# Patient Record
Sex: FEMALE | Race: WHITE
[De-identification: names, ages, dates, MRNs, and addresses within clinical notes are randomized per-mention and may not be internally consistent; named-entity substitution may affect disease eponyms.]

---

## 2017-10-19 ENCOUNTER — HOSPITAL ENCOUNTER (OUTPATIENT)
Dept: HOSPITAL 5 - MAMMO | Age: 37
Discharge: HOME | End: 2017-10-19
Attending: FAMILY MEDICINE
Payer: COMMERCIAL

## 2017-10-19 DIAGNOSIS — M25.531: ICD-10-CM

## 2017-10-19 DIAGNOSIS — M25.562: ICD-10-CM

## 2017-10-19 DIAGNOSIS — N64.4: Primary | ICD-10-CM

## 2017-10-19 DIAGNOSIS — M25.541: ICD-10-CM

## 2017-10-19 DIAGNOSIS — M25.542: ICD-10-CM

## 2017-10-19 DIAGNOSIS — M25.532: ICD-10-CM

## 2017-10-19 DIAGNOSIS — M25.561: ICD-10-CM

## 2017-10-19 PROCEDURE — 73110 X-RAY EXAM OF WRIST: CPT

## 2017-10-19 PROCEDURE — 73130 X-RAY EXAM OF HAND: CPT

## 2017-10-19 PROCEDURE — 73562 X-RAY EXAM OF KNEE 3: CPT

## 2017-10-19 PROCEDURE — G0204 DX MAMMO INCL CAD BI: HCPCS

## 2017-10-19 PROCEDURE — 77066 DX MAMMO INCL CAD BI: CPT

## 2017-10-19 NOTE — XRAY REPORT
BILATERAL WRIST RADIOGRAPHS



INDICATION: Bilateral wrist pain.



COMPARISON: None similar.



FINDINGS: AP, lateral and oblique bilateral wrist radiographs, 4 

projections demonstrate intact carpal rows and also remainder imaged 

bones. Unremarkable soft tissues.



CONCLUSION: Normal bilateral wrist radiographs, as described. 



Thank you for the opportunity to participate in this patient's care.

## 2017-10-19 NOTE — MAMMOGRAPHY REPORT
BILATERAL DIGITAL DIAGNOSTIC MAMMOGRAM with CAD: 10/19/17 10:01:00



CLINICAL: Bilateral breast pain.



COMPARISON:06/01/16



FINDINGS: The breasts are predominantly fatty with a few residual 

bilateral central fibroglandular densities.No mass, architectural 

distortion or suspicious calcifications.   







IMPRESSION: No mammographic evidence of malignancy.



BI-RADS CATEGORY:  Negative mammogram



RECOMMENDATION: Clinical followup and routine mammographic screening 

based on ACS guidelines.  I doubt that ultrasound would be helpful 

given the predominant fatty composition of the breasts.



ACR BI-RADS MAMMOGRAPHIC CODES:

0 = Needs additional imaging evaluation; 1 = Negative; 2 = Benign; 3 = 

Probably benign; 4 = Suspicious; 5 = Malignant; 6 = Known biopsy-proven 

malignancy



COMMENT:

      1.   Dense breast tissue, i.e., adenosis, fibrocystic 

            changes, etc., may obscure an underlying neoplasm.

      2.   Approximately 10% of cancers are not detected with

            mammography.

      3.   A negative mammography report should not delay biopsy 

            if a clinically suspicious mass is present.





COMMENT:

Patient follow-up letters are generated by our [employer] application.

## 2017-10-19 NOTE — XRAY REPORT
RIGHT KNEE RADIOGRAPHS



INDICATION: Right knee pain.



COMPARISON: None similar.



FINDINGS: AP, lateral and oblique right knee radiographs demonstrate 

intact bony articulation and appearance.  Minimal medial corner 

degenerative spurring may be present.  Normal soft tissues without 

evidence of suprapatellar effusion.



CONCLUSION: No acute right knee radiographic abnormality, as described.



Thank you for the opportunity to participate in this patient's care.

## 2017-10-19 NOTE — XRAY REPORT
BILATERAL HAND RADIOGRAPHS



INDICATION: Bilateral hand pain.



COMPARISON: None similar.



FINDINGS: AP, lateral and oblique bilateral hand radiographs 

demonstrate normal bones, joints and soft tissues.



CONCLUSION: Normal.



Thank you for the opportunity to participate in this patient's care.

## 2022-06-22 ENCOUNTER — HOSPITAL ENCOUNTER (OUTPATIENT)
Dept: HOSPITAL 5 - SPVWC | Age: 42
Discharge: HOME | End: 2022-06-22
Attending: FAMILY MEDICINE
Payer: COMMERCIAL

## 2022-06-22 DIAGNOSIS — Z12.31: Primary | ICD-10-CM

## 2022-06-22 PROCEDURE — 77067 SCR MAMMO BI INCL CAD: CPT

## 2022-06-24 NOTE — MAMMOGRAPHY REPORT
DIGITAL SCREENING MAMMOGRAM WITH CAD, 6/23/2022



CLINICAL INFORMATION / INDICATION: Routine screening mammography.



TECHNIQUE:  Digital bilateral 2D mammography was obtained in the craniocaudal and mediolateral obliqu
e projections. This examination was interpreted with the benefit of Computer-Aided Detection analysis
.



COMPARISON: 10/19/2017



FINDINGS: 



Breast Density: There are scattered areas of fibroglandular density.



No dominant mass, suspicious calcifications, or architectural distortion in either breast. 



No interval change.

 

IMPRESSION: No mammographic evidence of malignancy.



Follow up recommendation: Routine yearly screening mammogram.



BI-RADS Category 1:  NEGATIVE





-------------------------------------------------------------------------------------------

A "normal" or negative report should not discourage follow up or biopsy of a clinically significant f
inding.



A written summary of these findings will be mailed to the patient. The patient will be entered into a
 mammography reporting system which will generate a reminder letter for the patient's next appointmen
t at the appropriate interval.



The [employer] recommends yearly mammograms starting at age 40 and continuing as l
vivek as a woman is in good health.  Breast MRI is recommended for women with an approximate 20-25% or 
greater lifetime risk of breast cancer, including women with a strong family history of breast or ova
soham cancer or who have been treated for Hodgkin's disease.



Signer Name: Jennifer Kaiser MD 

Signed: 6/24/2022 12:40 PM

Workstation Name: [email or web address]